# Patient Record
Sex: MALE | Race: WHITE
[De-identification: names, ages, dates, MRNs, and addresses within clinical notes are randomized per-mention and may not be internally consistent; named-entity substitution may affect disease eponyms.]

---

## 2018-03-21 ENCOUNTER — HOSPITAL ENCOUNTER (EMERGENCY)
Dept: HOSPITAL 93 - ER | Age: 78
Discharge: HOME | End: 2018-03-21
Payer: COMMERCIAL

## 2018-03-21 VITALS — BODY MASS INDEX: 25.9 KG/M2 | WEIGHT: 165 LBS | HEIGHT: 67 IN
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
## 2018-03-21 DIAGNOSIS — S01.121A: Primary | ICD-10-CM
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
## 2018-06-01 ENCOUNTER — HOSPITAL ENCOUNTER (EMERGENCY)
Dept: HOSPITAL 93 - ER | Age: 78
Discharge: HOME | End: 2018-06-01
Payer: COMMERCIAL

## 2018-06-01 VITALS — WEIGHT: 163 LBS | HEIGHT: 67 IN | BODY MASS INDEX: 25.58 KG/M2

## 2018-06-01 DIAGNOSIS — R42: ICD-10-CM

## 2018-06-01 DIAGNOSIS — G25.2: ICD-10-CM

## 2018-06-01 DIAGNOSIS — D32.0: Primary | ICD-10-CM

## 2018-06-01 DIAGNOSIS — F41.8: ICD-10-CM

## 2018-06-01 PROCEDURE — 70552 MRI BRAIN STEM W/DYE: CPT

## 2019-04-19 ENCOUNTER — APPOINTMENT (OUTPATIENT)
Dept: MRI IMAGING | Age: 79
DRG: 101 | End: 2019-04-19
Attending: HOSPITALIST
Payer: COMMERCIAL

## 2019-04-19 ENCOUNTER — APPOINTMENT (OUTPATIENT)
Dept: CT IMAGING | Age: 79
DRG: 101 | End: 2019-04-19
Attending: HOSPITALIST
Payer: COMMERCIAL

## 2019-04-19 ENCOUNTER — HOSPITAL ENCOUNTER (EMERGENCY)
Age: 79
Discharge: ANOTHER HOSPITAL | End: 2019-04-19
Attending: EMERGENCY MEDICINE
Payer: MEDICARE

## 2019-04-19 ENCOUNTER — APPOINTMENT (OUTPATIENT)
Dept: CT IMAGING | Age: 79
End: 2019-04-19
Attending: EMERGENCY MEDICINE
Payer: MEDICARE

## 2019-04-19 ENCOUNTER — APPOINTMENT (OUTPATIENT)
Dept: GENERAL RADIOLOGY | Age: 79
End: 2019-04-19
Attending: EMERGENCY MEDICINE
Payer: MEDICARE

## 2019-04-19 ENCOUNTER — HOSPITAL ENCOUNTER (INPATIENT)
Age: 79
LOS: 1 days | Discharge: HOME | DRG: 101 | End: 2019-04-20
Attending: EMERGENCY MEDICINE | Admitting: HOSPITALIST
Payer: COMMERCIAL

## 2019-04-19 VITALS
DIASTOLIC BLOOD PRESSURE: 56 MMHG | SYSTOLIC BLOOD PRESSURE: 126 MMHG | OXYGEN SATURATION: 91 % | BODY MASS INDEX: 24.33 KG/M2 | TEMPERATURE: 97.9 F | RESPIRATION RATE: 24 BRPM | HEIGHT: 67 IN | HEART RATE: 74 BPM | WEIGHT: 155 LBS

## 2019-04-19 DIAGNOSIS — D32.0 MENINGIOMA, CEREBRAL (HCC): ICD-10-CM

## 2019-04-19 DIAGNOSIS — R06.2 BILATERAL WHEEZING: ICD-10-CM

## 2019-04-19 DIAGNOSIS — R56.9 SEIZURE (HCC): Primary | ICD-10-CM

## 2019-04-19 DIAGNOSIS — R56.9 NEW ONSET SEIZURE (HCC): Primary | ICD-10-CM

## 2019-04-19 DIAGNOSIS — G93.89 FRONTAL MASS OF BRAIN: ICD-10-CM

## 2019-04-19 DIAGNOSIS — Z87.820 HISTORY OF TRAUMATIC BRAIN INJURY: ICD-10-CM

## 2019-04-19 DIAGNOSIS — G40.409 EPILEPSY SYMPTOMATIC, GENERALIZED (HCC): ICD-10-CM

## 2019-04-19 LAB
ALBUMIN SERPL-MCNC: 3.7 G/DL (ref 3.5–5)
ALBUMIN/GLOB SERPL: 1.1 {RATIO} (ref 1.1–2.2)
ALP SERPL-CCNC: 68 U/L (ref 45–117)
ALT SERPL-CCNC: 29 U/L (ref 12–78)
ANION GAP SERPL CALC-SCNC: 10 MMOL/L (ref 5–15)
APTT PPP: 32 SEC (ref 22.1–32)
AST SERPL-CCNC: 36 U/L (ref 15–37)
ATRIAL RATE: 72 BPM
BASOPHILS # BLD: 0.1 K/UL (ref 0–0.1)
BASOPHILS NFR BLD: 1 % (ref 0–1)
BILIRUB SERPL-MCNC: 0.6 MG/DL (ref 0.2–1)
BNP SERPL-MCNC: 47 PG/ML
BUN SERPL-MCNC: 17 MG/DL (ref 6–20)
BUN/CREAT SERPL: 13 (ref 12–20)
CALCIUM SERPL-MCNC: 9.1 MG/DL (ref 8.5–10.1)
CALCULATED P AXIS, ECG09: 59 DEGREES
CALCULATED R AXIS, ECG10: 45 DEGREES
CALCULATED T AXIS, ECG11: 49 DEGREES
CHLORIDE SERPL-SCNC: 107 MMOL/L (ref 97–108)
CO2 SERPL-SCNC: 23 MMOL/L (ref 21–32)
COMMENT, HOLDF: NORMAL
COMMENT, HOLDF: NORMAL
CREAT SERPL-MCNC: 1.32 MG/DL (ref 0.7–1.3)
DIAGNOSIS, 93000: NORMAL
DIFFERENTIAL METHOD BLD: ABNORMAL
EOSINOPHIL # BLD: 0.3 K/UL (ref 0–0.4)
EOSINOPHIL NFR BLD: 4 % (ref 0–7)
ERYTHROCYTE [DISTWIDTH] IN BLOOD BY AUTOMATED COUNT: 12.9 % (ref 11.5–14.5)
FLUAV AG NPH QL IA: NEGATIVE
FLUBV AG NOSE QL IA: NEGATIVE
GLOBULIN SER CALC-MCNC: 3.5 G/DL (ref 2–4)
GLUCOSE SERPL-MCNC: 157 MG/DL (ref 65–100)
HCT VFR BLD AUTO: 49.8 % (ref 36.6–50.3)
HGB BLD-MCNC: 16.5 G/DL (ref 12.1–17)
IMM GRANULOCYTES # BLD AUTO: 0 K/UL (ref 0–0.04)
IMM GRANULOCYTES NFR BLD AUTO: 1 % (ref 0–0.5)
INR PPP: 1 (ref 0.9–1.1)
LYMPHOCYTES # BLD: 1.6 K/UL (ref 0.8–3.5)
LYMPHOCYTES NFR BLD: 21 % (ref 12–49)
MCH RBC QN AUTO: 30.8 PG (ref 26–34)
MCHC RBC AUTO-ENTMCNC: 33.1 G/DL (ref 30–36.5)
MCV RBC AUTO: 92.9 FL (ref 80–99)
MONOCYTES # BLD: 0.4 K/UL (ref 0–1)
MONOCYTES NFR BLD: 5 % (ref 5–13)
NEUTS SEG # BLD: 5.3 K/UL (ref 1.8–8)
NEUTS SEG NFR BLD: 68 % (ref 32–75)
NRBC # BLD: 0 K/UL (ref 0–0.01)
NRBC BLD-RTO: 0 PER 100 WBC
P-R INTERVAL, ECG05: 176 MS
PLATELET # BLD AUTO: 160 K/UL (ref 150–400)
PMV BLD AUTO: 10 FL (ref 8.9–12.9)
POTASSIUM SERPL-SCNC: 4.3 MMOL/L (ref 3.5–5.1)
PROT SERPL-MCNC: 7.2 G/DL (ref 6.4–8.2)
PROTHROMBIN TIME: 10.1 SEC (ref 9–11.1)
Q-T INTERVAL, ECG07: 390 MS
QRS DURATION, ECG06: 86 MS
QTC CALCULATION (BEZET), ECG08: 427 MS
RBC # BLD AUTO: 5.36 M/UL (ref 4.1–5.7)
SAMPLES BEING HELD,HOLD: NORMAL
SAMPLES BEING HELD,HOLD: NORMAL
SODIUM SERPL-SCNC: 140 MMOL/L (ref 136–145)
THERAPEUTIC RANGE,PTTT: NORMAL SECS (ref 58–77)
TROPONIN I SERPL-MCNC: <0.05 NG/ML
VENTRICULAR RATE, ECG03: 72 BPM
WBC # BLD AUTO: 7.7 K/UL (ref 4.1–11.1)

## 2019-04-19 PROCEDURE — 99285 EMERGENCY DEPT VISIT HI MDM: CPT

## 2019-04-19 PROCEDURE — 36415 COLL VENOUS BLD VENIPUNCTURE: CPT

## 2019-04-19 PROCEDURE — 74011250636 HC RX REV CODE- 250/636: Performed by: HOSPITALIST

## 2019-04-19 PROCEDURE — 85610 PROTHROMBIN TIME: CPT

## 2019-04-19 PROCEDURE — 74011250636 HC RX REV CODE- 250/636: Performed by: EMERGENCY MEDICINE

## 2019-04-19 PROCEDURE — 93005 ELECTROCARDIOGRAM TRACING: CPT

## 2019-04-19 PROCEDURE — 80053 COMPREHEN METABOLIC PANEL: CPT

## 2019-04-19 PROCEDURE — 71045 X-RAY EXAM CHEST 1 VIEW: CPT

## 2019-04-19 PROCEDURE — 74011000258 HC RX REV CODE- 258: Performed by: EMERGENCY MEDICINE

## 2019-04-19 PROCEDURE — 84484 ASSAY OF TROPONIN QUANT: CPT

## 2019-04-19 PROCEDURE — 74011636637 HC RX REV CODE- 636/637: Performed by: EMERGENCY MEDICINE

## 2019-04-19 PROCEDURE — 74011000258 HC RX REV CODE- 258: Performed by: HOSPITALIST

## 2019-04-19 PROCEDURE — A9575 INJ GADOTERATE MEGLUMI 0.1ML: HCPCS | Performed by: HOSPITALIST

## 2019-04-19 PROCEDURE — 74011000250 HC RX REV CODE- 250: Performed by: EMERGENCY MEDICINE

## 2019-04-19 PROCEDURE — 71250 CT THORAX DX C-: CPT

## 2019-04-19 PROCEDURE — 65660000000 HC RM CCU STEPDOWN

## 2019-04-19 PROCEDURE — 74011250637 HC RX REV CODE- 250/637: Performed by: FAMILY MEDICINE

## 2019-04-19 PROCEDURE — 96374 THER/PROPH/DIAG INJ IV PUSH: CPT

## 2019-04-19 PROCEDURE — 83880 ASSAY OF NATRIURETIC PEPTIDE: CPT

## 2019-04-19 PROCEDURE — 74011000250 HC RX REV CODE- 250: Performed by: HOSPITALIST

## 2019-04-19 PROCEDURE — 70450 CT HEAD/BRAIN W/O DYE: CPT

## 2019-04-19 PROCEDURE — C9113 INJ PANTOPRAZOLE SODIUM, VIA: HCPCS | Performed by: HOSPITALIST

## 2019-04-19 PROCEDURE — 85025 COMPLETE CBC W/AUTO DIFF WBC: CPT

## 2019-04-19 PROCEDURE — 70553 MRI BRAIN STEM W/O & W/DYE: CPT

## 2019-04-19 PROCEDURE — 94640 AIRWAY INHALATION TREATMENT: CPT

## 2019-04-19 PROCEDURE — 87804 INFLUENZA ASSAY W/OPTIC: CPT

## 2019-04-19 PROCEDURE — 85730 THROMBOPLASTIN TIME PARTIAL: CPT

## 2019-04-19 RX ORDER — LORAZEPAM 2 MG/ML
1 INJECTION INTRAMUSCULAR
Status: DISCONTINUED | OUTPATIENT
Start: 2019-04-19 | End: 2019-04-20 | Stop reason: HOSPADM

## 2019-04-19 RX ORDER — THERA TABS 400 MCG
1 TAB ORAL DAILY
COMMUNITY

## 2019-04-19 RX ORDER — PREDNISONE 20 MG/1
60 TABLET ORAL
Status: COMPLETED | OUTPATIENT
Start: 2019-04-19 | End: 2019-04-19

## 2019-04-19 RX ORDER — GADOTERATE MEGLUMINE 376.9 MG/ML
15 INJECTION INTRAVENOUS
Status: COMPLETED | OUTPATIENT
Start: 2019-04-19 | End: 2019-04-19

## 2019-04-19 RX ORDER — ACETAMINOPHEN 325 MG/1
650 TABLET ORAL
Status: DISCONTINUED | OUTPATIENT
Start: 2019-04-19 | End: 2019-04-20 | Stop reason: HOSPADM

## 2019-04-19 RX ORDER — ONDANSETRON 2 MG/ML
4 INJECTION INTRAMUSCULAR; INTRAVENOUS
Status: DISCONTINUED | OUTPATIENT
Start: 2019-04-19 | End: 2019-04-20 | Stop reason: HOSPADM

## 2019-04-19 RX ORDER — IPRATROPIUM BROMIDE AND ALBUTEROL SULFATE 2.5; .5 MG/3ML; MG/3ML
3 SOLUTION RESPIRATORY (INHALATION)
Status: COMPLETED | OUTPATIENT
Start: 2019-04-19 | End: 2019-04-19

## 2019-04-19 RX ORDER — SODIUM CHLORIDE 0.9 % (FLUSH) 0.9 %
5-40 SYRINGE (ML) INJECTION EVERY 8 HOURS
Status: DISCONTINUED | OUTPATIENT
Start: 2019-04-19 | End: 2019-04-20 | Stop reason: HOSPADM

## 2019-04-19 RX ORDER — SODIUM CHLORIDE 0.9 % (FLUSH) 0.9 %
5-40 SYRINGE (ML) INJECTION AS NEEDED
Status: DISCONTINUED | OUTPATIENT
Start: 2019-04-19 | End: 2019-04-20 | Stop reason: HOSPADM

## 2019-04-19 RX ORDER — SODIUM CHLORIDE 9 MG/ML
100 INJECTION, SOLUTION INTRAVENOUS CONTINUOUS
Status: DISPENSED | OUTPATIENT
Start: 2019-04-19 | End: 2019-04-19

## 2019-04-19 RX ADMIN — IPRATROPIUM BROMIDE AND ALBUTEROL SULFATE 3 ML: .5; 3 SOLUTION RESPIRATORY (INHALATION) at 05:00

## 2019-04-19 RX ADMIN — SODIUM CHLORIDE 500 MG: 900 INJECTION, SOLUTION INTRAVENOUS at 18:34

## 2019-04-19 RX ADMIN — LEVETIRACETAM 500 MG: 500 INJECTION, SOLUTION INTRAVENOUS at 06:22

## 2019-04-19 RX ADMIN — GADOTERATE MEGLUMINE 15 ML: 376.9 INJECTION INTRAVENOUS at 21:12

## 2019-04-19 RX ADMIN — ACETAMINOPHEN 650 MG: 325 TABLET ORAL at 22:48

## 2019-04-19 RX ADMIN — SODIUM CHLORIDE 100 ML/HR: 900 INJECTION, SOLUTION INTRAVENOUS at 09:55

## 2019-04-19 RX ADMIN — PANTOPRAZOLE SODIUM 40 MG: 40 INJECTION, POWDER, FOR SOLUTION INTRAVENOUS at 09:55

## 2019-04-19 RX ADMIN — PREDNISONE 60 MG: 20 TABLET ORAL at 05:00

## 2019-04-19 RX ADMIN — Medication 10 ML: at 18:34

## 2019-04-19 RX ADMIN — Medication 10 ML: at 22:17

## 2019-04-19 NOTE — ROUTINE PROCESS
TRANSFER - OUT REPORT: 
 
Verbal report given to Sanjana Willard RN(name) on Meenakshi Johnson  being transferred to [facility](unit) for routine progression of care Report consisted of patients Situation, Background, Assessment and  
Recommendations(SBAR). Information from the following report(s) SBAR, ED Summary, [facility] and Recent Results was reviewed with the receiving nurse. Lines:  
Peripheral IV 04/19/19 Left Antecubital (Active) Site Assessment Clean, dry, & intact 4/19/2019  4:45 AM  
Phlebitis Assessment 0 4/19/2019  4:45 AM  
Infiltration Assessment 0 4/19/2019  4:45 AM  
Dressing Status Clean, dry, & intact 4/19/2019  4:45 AM  
Dressing Type Transparent;Tape 4/19/2019  4:45 AM  
Hub Color/Line Status Green;Patent; Flushed 4/19/2019  4:45 AM  
  
 
Opportunity for questions and clarification was provided. Patient transported with: 
 Monitor O2 @ 2 liters

## 2019-04-19 NOTE — ED NOTES
Patient arrives from [facility] for admission, had new onset seizure, found to have a brain mass. Neurosurgery aware of the patient. CONSULT NOTE: 
8:39 AM Karina Middleton MD communicated with Ayaan Faith NP, Consult for Hospitalist via [facility] Text. Discussed available diagnostic tests and clinical findings. Dr. Jovan Diaz will see and admit the patient.

## 2019-04-19 NOTE — ROUTINE PROCESS
Bedside shift change report given to Jalen (oncoming nurse) by Zachariah Campbell (offgoing nurse). Report included the following information SBAR, Kardex, Med Rec Status and Cardiac Rhythm NSR c 1 AVB. Uzbek speaking. Family in room. Patient awaiting MRI.

## 2019-04-19 NOTE — PROGRESS NOTES
Admission Medication Reconciliation: 
Information obtained from: patient's family members Significant PMH/Disease States: No past medical history on file. Chief Complaint for this Admission:  seizure Allergies: Avelox [moxifloxacin] Prior to Admission Medications:  
Prior to Admission Medications Prescriptions Last Dose Informant Patient Reported? Taking? therapeutic multivitamin [facility]) tablet   Yes Yes Sig: Take 1 Tab by mouth daily. Facility-Administered Medications: None Comments/Recommendations: Added MVI. Patient's family noted that he does not take any other medications.

## 2019-04-19 NOTE — ED PROVIDER NOTES
The history is provided by the patient, a relative and the spouse. The history is limited by a language barrier. A  was used (relatives). Respiratory Distress This is a new problem. The current episode started yesterday. The problem has been rapidly worsening. Associated symptoms include cough and wheezing. Pertinent negatives include no fever and no headaches. Precipitated by: possible pollens and recent travel. Treatments tried: duo-neb by EMS. The treatment provided moderate relief. No past medical history on file. Patient with \"hernia pushing on his lungs\" scheduled for surgery back in [facility] 
No Hx of asthma or COPD No past surgical history on file. No family history on file. Social History Socioeconomic History  Marital status: Not on file Spouse name: Not on file  Number of children: Not on file  Years of education: Not on file  Highest education level: Not on file Occupational History  Not on file Social Needs  Financial resource strain: Not on file  Food insecurity:  
  Worry: Not on file Inability: Not on file  Transportation needs:  
  Medical: Not on file Non-medical: Not on file Tobacco Use  Smoking status: Not on file Substance and Sexual Activity  Alcohol use: Not on file  Drug use: Not on file  Sexual activity: Not on file Lifestyle  Physical activity:  
  Days per week: Not on file Minutes per session: Not on file  Stress: Not on file Relationships  Social connections:  
  Talks on phone: Not on file Gets together: Not on file Attends Christianity service: Not on file Active member of club or organization: Not on file Attends meetings of clubs or organizations: Not on file Relationship status: Not on file  Intimate partner violence:  
  Fear of current or ex partner: Not on file Emotionally abused: Not on file Physically abused: Not on file Forced sexual activity: Not on file Other Topics Concern  Not on file Social History Narrative  Not on file ALLERGIES: Avelox [moxifloxacin] Review of Systems Constitutional: Negative for chills and fever. HENT: Positive for congestion and drooling. Respiratory: Positive for cough, chest tightness, shortness of breath and wheezing. Cardiovascular: Negative. Gastrointestinal: Negative. Genitourinary: Negative. Musculoskeletal: Negative. Skin: Negative. Neurological: Positive for seizures. Negative for headaches. Per wife jaw clenched, drooling and arms/legs shaking and stiff Psychiatric/Behavioral: Negative. Vitals:  
 04/19/19 8486 BP: 124/58 Pulse: 68 Resp: 11 Temp: 97.9 °F (36.6 °C) SpO2: 95% Weight: 70.3 kg (155 lb) Height: 5' 7\" (1.702 m) Physical Exam  
Constitutional: He is oriented to person, place, and time. He appears well-developed and well-nourished. He appears distressed. HENT:  
Head: Normocephalic and atraumatic. Eyes: Pupils are equal, round, and reactive to light. Conjunctivae and EOM are normal.  
Neck: Normal range of motion. Cardiovascular: Normal rate, regular rhythm, normal heart sounds and intact distal pulses. No murmur heard. Pulmonary/Chest: No stridor. He is in respiratory distress. He has wheezes. Abdominal: Soft. Bowel sounds are normal. There is no tenderness. There is no guarding. Musculoskeletal: Normal range of motion. He exhibits no edema, tenderness or deformity. Neurological: He is alert and oriented to person, place, and time. He exhibits normal muscle tone. Coordination normal.  
Skin: Skin is warm and dry. He is not diaphoretic. Psychiatric: He has a normal mood and affect. Nursing note and vitals reviewed. MDM Number of Diagnoses or Management Options Bilateral wheezing:  
Frontal mass of brain:  
New onset seizure [facility]):  
 Diagnosis management comments: 1) respiratory distress - possible reactive airway disease due to pollens vs PNA vs other illness (ie. 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 
Plan - check labs, CXR, EKG, give neb and prednisone and re-eval 
 
  
Amount and/or Complexity of Data Reviewed Clinical lab tests: ordered and reviewed Tests in the radiology section of CPT®: ordered and reviewed Discuss the patient with other providers: yes (Dr. Natasha Healy - neurosurgery, Dr. Shane Boogie - hospitalist and Dr. Jodi Mejia - Emergency medicine at [facility] to arrange transfer to that facility ) Independent visualization of images, tracings, or specimens: yes (Head CT - right frontal lobe mass, ?surrounding edema) Risk of Complications, Morbidity, and/or Mortality Presenting problems: high Diagnostic procedures: high Management options: high Critical Care Total time providing critical care: 30-74 minutes (Total critical care time spent exclusive of procedures:  35 minutes, new onset seizure, wheezing with respiratory distress. Discussions with patient and family and other providers to arrange transfer to higher level of care.) Procedures EK19 @ 04:47  normal EKG, normal sinus rhythm, rate 72, normal intervals, no ectopy, no ischemia noted. VITALS:  
Patient Vitals for the past 8 hrs: 
 Temp Pulse Resp BP SpO2  
19 0445  74 30 137/53 99 % 19 0438 97.9 °F (36.6 °C) 68 11 124/58 95 % Recent Results (from the past 24 hour(s)) CBC WITH AUTOMATED DIFF Collection Time: 19  4:45 AM  
Result Value Ref Range WBC 7.7 4.1 - 11.1 K/uL  
 RBC 5.36 4.10 - 5.70 M/uL  
 HGB 16.5 12.1 - 17.0 g/dL HCT 49.8 36.6 - 50.3 % MCV 92.9 80.0 - 99.0 FL  
 MCH 30.8 26.0 - 34.0 PG  
 MCHC 33.1 30.0 - 36.5 g/dL  
 RDW 12.9 11.5 - 14.5 % PLATELET 797 710 - 162 K/uL MPV 10.0 8.9 - 12.9 FL  
 NRBC 0.0 0  WBC ABSOLUTE NRBC 0.00 0.00 - 0.01 K/uL NEUTROPHILS 68 32 - 75 % LYMPHOCYTES 21 12 - 49 % MONOCYTES 5 5 - 13 % EOSINOPHILS 4 0 - 7 % BASOPHILS 1 0 - 1 % IMMATURE GRANULOCYTES 1 (H) 0.0 - 0.5 % ABS. NEUTROPHILS 5.3 1.8 - 8.0 K/UL  
 ABS. LYMPHOCYTES 1.6 0.8 - 3.5 K/UL  
 ABS. MONOCYTES 0.4 0.0 - 1.0 K/UL  
 ABS. EOSINOPHILS 0.3 0.0 - 0.4 K/UL  
 ABS. BASOPHILS 0.1 0.0 - 0.1 K/UL  
 ABS. IMM. GRANS. 0.0 0.00 - 0.04 K/UL  
 DF AUTOMATED METABOLIC PANEL, COMPREHENSIVE Collection Time: 04/19/19  4:45 AM  
Result Value Ref Range Sodium 140 136 - 145 mmol/L Potassium 4.3 3.5 - 5.1 mmol/L Chloride 107 97 - 108 mmol/L  
 CO2 23 21 - 32 mmol/L Anion gap 10 5 - 15 mmol/L Glucose 157 (H) 65 - 100 mg/dL BUN 17 6 - 20 MG/DL Creatinine 1.32 (H) 0.70 - 1.30 MG/DL  
 BUN/Creatinine ratio 13 12 - 20 GFR est AA >60 >60 ml/min/1.73m2 GFR est non-AA 53 (L) >60 ml/min/1.73m2 Calcium 9.1 8.5 - 10.1 MG/DL Bilirubin, total 0.6 0.2 - 1.0 MG/DL  
 ALT (SGPT) 29 12 - 78 U/L  
 AST (SGOT) 36 15 - 37 U/L Alk. phosphatase 68 45 - 117 U/L Protein, total 7.2 6.4 - 8.2 g/dL Albumin 3.7 3.5 - 5.0 g/dL Globulin 3.5 2.0 - 4.0 g/dL A-G Ratio 1.1 1.1 - 2.2 INFLUENZA A & B AG (RAPID TEST) Collection Time: 04/19/19  4:45 AM  
Result Value Ref Range Influenza A Antigen NEGATIVE  NEG Influenza B Antigen NEGATIVE  NEG    
NT-PRO BNP Collection Time: 04/19/19  4:45 AM  
Result Value Ref Range NT pro-BNP 47 <450 PG/ML  
TROPONIN I Collection Time: 04/19/19  4:45 AM  
Result Value Ref Range Troponin-I, Qt. <0.05 <0.05 ng/mL EKG, 12 LEAD, INITIAL Collection Time: 04/19/19  4:47 AM  
Result Value Ref Range  Ventricular Rate 72 BPM  
 Atrial Rate 72 BPM  
 P-R Interval 176 ms QRS Duration 86 ms  
 Q-T Interval 390 ms QTC Calculation (Bezet) 427 ms Calculated P Axis 59 degrees Calculated R Axis 45 degrees Calculated T Axis 49 degrees Diagnosis Normal sinus rhythm Normal ECG No previous ECGs available SAMPLES BEING HELD Collection Time: 04/19/19  4:48 AM  
Result Value Ref Range SAMPLES BEING HELD SST,RD,ANGELO,DRK GRN   
 COMMENT Add-on orders for these samples will be processed based on acceptable specimen integrity and analyte stability, which may vary by analyte. Ct Head Wo Cont Result Date: 4/19/2019 EXAM:  CT head without contrast INDICATION: Seizure. COMPARISON: None. TECHNIQUE: Axial noncontrast head CT from foramen magnum to vertex. Coronal and sagittal reformatted images were obtained. CT dose reduction was achieved through use of a standardized protocol tailored for this examination and automatic exposure control for dose modulation. FINDINGS:  There is diffuse age-related parenchymal volume loss. The ventricles and sulci are age-appropriate without hydrocephalus. There is no mass effect or midline shift. There is no intracranial hemorrhage or extra-axial fluid collection. A right frontal lobe mass measures 1.7 x 1.9 cm (series 2, image 15). This may be parenchymal or dural-based. The basal cisterns are patent. A prior left frontoparietal craniotomy is noted. There is diffuse paranasal sinus mucosal thickening. The mastoid air cells are clear. IMPRESSION: 1. A right frontal lobe mass measures 1.7 x 1.9 cm. This may be parenchymal or dural based. Comparison with prior imaging is suggested. No acute intracranial hemorrhage. 2. Diffuse paranasal sinus inflammation. Xr Chest [facility] Result Date: 4/19/2019 EXAM:  CR chest portable INDICATION: Shortness of breath and wheezing COMPARISON: None. TECHNIQUE: Portable AP upright chest view at 0445 hours FINDINGS: Cardiac monitoring wires overlie the thorax.  The cardiomediastinal contours are within normal limits. The lungs and pleural spaces are clear. There is no pneumothorax. The bones and upper abdomen are normal for age. IMPRESSION: No acute process.

## 2019-04-19 NOTE — ED TRIAGE NOTES
Pt arrives via EMS from home c/o difficulty breathing. HX hernia \"pushing into lungs,\"  Pt it is from [facility] and has been here for 2 days. Pt had duo neb in route. EMS states audible wheezing on scene, has since improved since breathing treatment.

## 2019-04-19 NOTE — ROUTINE PROCESS
TRANSFER - OUT REPORT: 
 
Verbal report given to [facility] RN(name) on [employer]  being transferred to [facility](unit) for routine progression of care Report consisted of patients Situation, Background, Assessment and  
Recommendations(SBAR). Information from the following report(s) SBAR, Kardex, ED Summary, [facility] and Recent Results was reviewed with the receiving nurse. Lines:  
Peripheral IV 04/19/19 Right Antecubital (Active) Site Assessment Clean, dry, & intact 4/19/2019 10:06 AM  
Phlebitis Assessment 0 4/19/2019 10:06 AM  
Infiltration Assessment 0 4/19/2019 10:06 AM  
Dressing Status Clean, dry, & intact 4/19/2019 10:06 AM  
Hub Color/Line Status Pink 4/19/2019 10:06 AM  
  
 
Opportunity for questions and clarification was provided. Pt in no acute distress at time of transfer. Patient transported with: 
 [facility]
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
## 2019-04-19 NOTE — ED TRIAGE NOTES
Pt comes in via [facility] as a transfer from [facility] for neuro consult.  Pt had unwitnessed seizure this am around 3am. Pt has hx of TBI ~1 year ago in [facility].
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
## 2019-04-20 VITALS
DIASTOLIC BLOOD PRESSURE: 57 MMHG | OXYGEN SATURATION: 94 % | BODY MASS INDEX: 25.8 KG/M2 | HEIGHT: 67 IN | WEIGHT: 164.4 LBS | SYSTOLIC BLOOD PRESSURE: 112 MMHG | TEMPERATURE: 97.9 F | RESPIRATION RATE: 18 BRPM | HEART RATE: 59 BPM

## 2019-04-20 PROCEDURE — 74011250636 HC RX REV CODE- 250/636: Performed by: HOSPITALIST

## 2019-04-20 PROCEDURE — 74011000250 HC RX REV CODE- 250: Performed by: HOSPITALIST

## 2019-04-20 PROCEDURE — C9113 INJ PANTOPRAZOLE SODIUM, VIA: HCPCS | Performed by: HOSPITALIST

## 2019-04-20 PROCEDURE — 74011000258 HC RX REV CODE- 258: Performed by: HOSPITALIST

## 2019-04-20 PROCEDURE — 74011250637 HC RX REV CODE- 250/637: Performed by: PSYCHIATRY & NEUROLOGY

## 2019-04-20 RX ORDER — ACETAMINOPHEN 325 MG/1
650 TABLET ORAL
Qty: 20 TAB | Refills: 0 | Status: SHIPPED | OUTPATIENT
Start: 2019-04-20

## 2019-04-20 RX ORDER — DEXTROSE MONOHYDRATE AND SODIUM CHLORIDE 5; .45 G/100ML; G/100ML
75 INJECTION, SOLUTION INTRAVENOUS CONTINUOUS
Status: DISCONTINUED | OUTPATIENT
Start: 2019-04-20 | End: 2019-04-20 | Stop reason: HOSPADM

## 2019-04-20 RX ORDER — LEVETIRACETAM 500 MG/1
500 TABLET ORAL 2 TIMES DAILY
Qty: 60 TAB | Refills: 2 | Status: SHIPPED | OUTPATIENT
Start: 2019-04-20

## 2019-04-20 RX ORDER — LEVETIRACETAM 500 MG/1
500 TABLET ORAL 2 TIMES DAILY
Status: DISCONTINUED | OUTPATIENT
Start: 2019-04-20 | End: 2019-04-20 | Stop reason: HOSPADM

## 2019-04-20 RX ORDER — ONDANSETRON 4 MG/1
4 TABLET, FILM COATED ORAL
Qty: 20 TAB | Refills: 0 | Status: SHIPPED | OUTPATIENT
Start: 2019-04-20

## 2019-04-20 RX ADMIN — LEVETIRACETAM 500 MG: 500 TABLET ORAL at 13:06

## 2019-04-20 RX ADMIN — PANTOPRAZOLE SODIUM 40 MG: 40 INJECTION, POWDER, FOR SOLUTION INTRAVENOUS at 10:37

## 2019-04-20 RX ADMIN — Medication 10 ML: at 13:07

## 2019-04-20 RX ADMIN — Medication 10 ML: at 06:34

## 2019-04-20 RX ADMIN — SODIUM CHLORIDE 500 MG: 900 INJECTION, SOLUTION INTRAVENOUS at 06:34
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
## 2019-04-20 NOTE — DISCHARGE INSTRUCTIONS
Patient Education     Patient to have a regular diet as tolerated. Patient to do regular daily activities as tolerated. Patient to start taking Keppra 500 mg orally twice a day to prevent recurrent seizures. Patient clearly instructed to not drive neither operate any heavy equipment or machinery until cleared to do so by a Neurologist in the future. This information is reportable to [facility] by law. Patient to follow up with PCP in 1 week. Patient to follow up with Neurosurgery in 2 weeks. Patient to follow up with Neurology in 2 weeks. Convulsión: Instrucciones de cuidado - [ Seizure: Care Instructions ]  Instrucciones de [address] convulsiones son causadas por patrones anormales de señales eléctricas en el cerebro. Son diferentes para cada persona. Las convulsiones pueden afectar el movimiento, el habla, la visión o la conciencia. Algunas personas solo tienen temblores leves en [place] mano y no pierden el conocimiento. Otras personas pueden desmayarse y tener temblores violentos en todo el cuerpo. Algunas personas parecen tener la mirada fija en el espacio. Están despiertas, margie no pueden responder normalmente. Más tarde, es posible que no recuerden lo que sucedió. Amy Gala necesarias pruebas para identificar el tipo y [address] convulsiones. Un episodio de convulsiones puede ocurrir solo adiel vez, o quizás las tenga más de Alfonso. Min los [facility] fueron indicados y hacer un seguimiento con conn médico puede ayudar a que no tenga más convulsiones. El médico lo avery examinado minuciosamente, margie pueden desarrollarse problemas más tarde. Si nota algún problema o nuevos síntomas, busque tratamiento médico de inmediato. La atención de seguimiento es adiel parte clave de conn tratamiento y seguridad. Asegúrese de hacer y acudir a todas las citas, y llame a conn médico si está teniendo problemas.  También es adiel buena idea saber los resultados de gus exámenes y mantener adiel lista de [place] medicamentos que francisco. ¿Cómo puede cuidarse en el hogar? · Sea dm con los medicamentos. [place] gus medicamentos exactamente analisa le fueron recetados. Llame a conn médico si piensa que está teniendo un problema con conn medicamento. · No realice ninguna actividad que pueda ser peligrosa para usted o los demás [employer] conn médico diga que es seguro [place]. Por ejemplo, no conduzca un automóvil, opere maquinaria, nade, ni suba por escaleras de mano. · Asegúrese de que quien lo esté tratando por cualquier problema de tena sepa que usted ha tenido convulsiones y qué medicamentos está tomando para ello. · Identifique y evite las cosas que puedan aumentar gus probabilidades de tener convulsiones. Estas pueden incluir la falta de sueño, el consumo de drogas o alcohol, el estrés y la falta de alimentación. · Asegúrese de asistir a la jose antonio de seguimiento. ¿Cuándo debe pedir ayuda? Llame al 911 en cualquier momento que crea que puede necesitar atención de [place]. Por ejemplo, llame si:    · Tiene otro episodio de convulsiones.     · Tiene más de un episodio de convulsiones en 24 horas.     · Tiene nuevos síntomas, tales analisa dificultades para caminar, hablar o pensar con claridad.    Llame a conn médico ahora mismo o busque atención médica inmediata si:    · Usted no está actuando de [place] normal.    Vigile muy de cerca los cambios en conn tena, y asegúrese de comunicarse con conn médico si tiene algún problema. ¿Dónde puede encontrar más información en inglés? Dada Schmitz a http://bob.info/. Gertrude  V175 en la búsqueda para aprender más acerca de \"Convulsión: Instrucciones de cuidado - [ Seizure: Care Instructions ]. \"  Revisado: 3 margarita, 2018  Versión del contenido: 11.9  © 6027-4396 [employer]. Las instrucciones de cuidado fueron adaptadas bajo licencia por [employer] (which disclaims liability or warranty for this information).  Si usted tiene [place] afección médica o sobre estas instrucciones, siempre pregunte a conn profesional de tena. [facility] niega toda garantía o responsabilidad por conn uso de esta información.

## 2019-04-20 NOTE — PROGRESS NOTES
Problem: Seizure Disorder (Adult) Goal: *STG: Remains free of seizure activity Outcome: Progressing Towards Goal 
  
Problem: Falls - Risk of 
Goal: *Absence of Falls Description Document [place] Fall Risk and appropriate interventions in the flowsheet.  
Outcome: Progressing Towards Goal

## 2019-04-20 NOTE — DISCHARGE SUMMARY
Kedar Watson 2906 SUMMARY    Name:  Markel Nielson  MR#:  [id number]  :  1940  ACCOUNT #:  [de-identified]  ADMIT DATE:  2019  DISCHARGE DATE:  2019      MAIN DIAGNOSIS ON ADMISSION:  Acute witnessed seizures. MAIN DIAGNOSIS ON DISCHARGE:  Acute onset seizures. CONSULTANTS:  1.  Neurology. 2.  Neurosurgery. HOSPITAL COURSE:  A 62-year-old primarily [place] Islander speaking gentleman with no significant past medical history except for a craniotomy in 2017 for a possible subdural hematoma was visiting family in the [address] [place], [place] [address] Floor from [facility] when the patient was noted to have some witnessed seizures. The patient was brought to the emergency room at which time an emergent CT scan of the head was positive for a frontal brain tumor. The patient was admitted to the neuro tele floor on further evaluation including with a metabolic panel that showed a sodium of 140, potassium 4.3, chloride 107, bicarb 23, a BUN of 17, creatinine 1.32, glucose 157, calcium 9.1. Normal liver function tests. First troponin less than 0.05, proBNP of 47. CBC with a WBC of 7.7, hemoglobin 16.5, hematocrit 49.8, platelet count of 490 and MCV of 92.9. A 12-lead EKG that showed normal sinus rhythm at 72 per minute. The patient underwent further evaluation with an MRI of the brain with and without contrast that showed a well-circumscribed 1.7 cm hypervascular extra-axial right subfrontal mass without edema most likely consistent with a meningioma. For this, the patient was seen and evaluated by the neurosurgical service who noted that no surgical indication was needed for the mass and recommended follow up with previous neurosurgeon as outpatient. The patient was seen by the neurology service who concurred with management of Keppra with transition to oral Keppra twice daily on discharge indefinitely.   The patient was clearly instructed throughout the hospital stay of the importance of avoiding any driving or operation of heavy equipment or machinery with a diagnosis of the acute seizures. The patient did not have any breakthrough seizures throughout the hospital course, was able to tolerate an advancing diet, able to ambulate unaided, had no other focal neurologic deficits and was deemed stable for discharge to home on twice daily Keppra after being cleared by neurology and neurosurgical services. EXAMINATION FINDINGS ON THIS DAY OF DISCHARGE:  GENERAL:  The patient is awake, alert, denying new complaints. VITAL SIGNS:  Blood pressure of 112/57, heart rate 59, respiratory rate 18, afebrile, saturating 94% on 2 liters nasal cannula. HEAD EXAM:  Normocephalic. Pupils equal and reactive to light. ENT EXAM:  Ears, nose, throat clear. NECK EXAM:  No jugular venous distention or carotid bruits. CARDIOVASCULAR EXAM:  S1, S2 present. RESPIRATORY EXAM:  Lungs with decreased air entry at both bases without any crepitations or rhonchi. GI EXAM:  Bowel sounds present. The abdomen is soft, nontender. No rebound, no guarding. GENITOURINARY EXAM:  Nil of note. MUSCULOSKELETAL EXAM:  No asymmetry of the extremities. CNS EXAM:  The patient is awake, alert; oriented to time, date, place, person. FINAL DIAGNOSES ON DISCHARGE ARE AS FOLLOWS:  1. Acute onset seizures without any status epilepticus. 2.  Frontal brain mass probably consistent with a meningioma. For outpatient neurosurgical followup. 3.  Probable dehydration. 4.  History of craniotomy for subdural hematoma in 2017. DISCHARGE MEDICATIONS ARE AS FOLLOWS:  1. Tylenol 650 mg p.o. every 6 hours as needed for headache or fever. 2.  Keppra 500 mg p.o. twice a day indefinitely. 3.  Zofran 4 mg 1 tablet p.o. every 8 hours as needed for nausea and vomiting. DISCHARGE INSTRUCTIONS:  The patient is to have a regular diet as tolerated, to do regular daily activities as tolerated.   The patient is to start taking Keppra 500 mg p.o. twice daily indefinitely to prevent recurrent seizures. The patient clearly instructed to not drive or operate any heavy equipment or machinery until cleared to do so in the future by neurologist.  Further, the patient also informed that this was a reportable measure to the Department of Motor Vehicle by law. The patient is to follow up with PCP in one week's time from discharge, neurologist in two weeks' time from discharge and neurosurgery in two weeks' time from discharge. The entire discharge plans including all medications, their side effects, the importance of adhering to all outpatient followup plans discussed in detail with the patient and family members at the bedside, primarily the patient's daughter, Flor Chou, who served as the . All questions and concerns were addressed. Total time for the discharge summary 50 minutes.         David Cox MD      [id number]  D:  04/20/2019 17:16  T:  04/20/2019 17:20  JOB #:  [id number]

## 2019-04-20 NOTE — PROGRESS NOTES
HD 2 
No further seizures 
afeb MRI consistent with small right sub-frontal meningioma without significant mass effect No surgery indicated at this time No evidence of SDH Will sign off

## 2019-04-20 NOTE — PROGRESS NOTES
Hospitalist Progress Note Alan Corona MD 
     
                             Answering service: [phone] OR [phone] from in house phone Date of Service:  2019 NAME:  Cornell Brito :  1940 MRN:  [id number] Admission Summary:  
 
68 Y/O primarily Kyrgyz speaking gentleman with a history significant for Craniotomy in 2017 for a probable Subdural hematoma and visiting from [facility] was brought to the ER for witnessed seizures. Reason for follow up:  
   
CC: witnessed seizures. Patient appeared comfortable on am rounds. Denied any headaches, dizziness, visual deficits. No documentation of further seizure activities. Assessment & Plan:  
 
1) CNS: Acute Seizures without any Status epilepticus. Probable Meningioma as noted on MRI brain. H/O Craniotomy () for a probable Subdural hematoma Continue Keppra, and close neurochecks. Neurosurgical eval noted and appreciated. Neurology consult. 2) Renal: Probable dehydration. IVFs and F/U BUN/Cr. 3) Prophylaxis: DVT. 4) Directives: Full Code with a guarded prognosis. D/W patient and patient's daughter. 5) Plan: Possible D/C to home on Anti-epileptic therapy in 1-2 days. Discussed in detail with patient and patient's daughter Marian Hernández at bedside serving as the . Hospital Problems  Never Reviewed Codes Class Noted POA Seizure ([facility] [address] ICD-10-CM: R56.9 ICD-9-CM: 780.39  2019 Unknown Right frontal lobe mass ICD-10-CM: G93.9 ICD-9-CM: 348.9  2019 Unknown Physical Examination:  
 
 Last 24hrs VS reviewed since prior progress note. Most recent are: 
Visit Vitals /40 (BP 1 Location: Left arm, BP Patient Position: At rest) Pulse (!) 56 Temp 97.5 °F (36.4 °C) Resp 21 Ht 5' 7\" (1.702 m) Wt 74.6 kg (164 lb 6.4 oz) SpO2 94% BMI 25.75 kg/m² Constitutional:  No acute distress, cooperative, pleasant   
HEENT: Head is a traumatic, PERRL, no nystagmus. Resp:  CTA bilaterally. No wheezing/rhonchi/rales. No accessory muscle use CV:  Regular rhythm, normal rate, no murmurs, gallops, rubs GI:  Soft, non distended, non tender. normoactive bowel sounds, no hepatosplenomegaly :  No CVA or suprapubic tenderness Skin  :  No erythema,rash,bullae,dipigmentation Musculoskeletal:  No edema, warm, 2+ pulses throughout Neuro: Awake and alert. Moves all extremities. No intake or output data in the 24 hours ending 04/20/19 1058 Labs:  
 
Recent Labs 04/19/19 
0445 WBC 7.7 HGB 16.5 HCT 49.8  Recent Labs 04/19/19 
0445   
K 4.3  CO2 23 BUN 17 CREA 1.32* * CA 9.1 Recent Labs 04/19/19 
0445 SGOT 36 ALT 29 AP 68 TBILI 0.6 TP 7.2 ALB 3.7 GLOB 3.5 Recent Labs 04/19/19 
0940 INR 1.0 PTP 10.1 APTT 32.0 No results for input(s): FE, TIBC, PSAT, FERR in the last 72 hours. No results found for: FOL, RBCF No results for input(s): PH, PCO2, PO2 in the last 72 hours. Recent Labs 04/19/19 
0445 TROIQ <0.05 No results found for: CHOL, CHOLX, CHLST, CHOLV, HDL, LDL, LDLC, DLDLP, TGLX, TRIGL, TRIGP, CHHD, CHHDX No results found for: John Pavan No results found for: COLOR, APPRN, SPGRU, REFSG, AKOSUA, PROTU, GLUCU, KETU, BILU, UROU, CRAIG, LEUKU, GLUKE, EPSU, BACTU, WBCU, RBCU, CASTS, UCRY Medications Reviewed:  
 
Current Facility-Administered Medications Medication Dose Route Frequency  sodium chloride (NS) flush 5-40 mL  5-40 mL IntraVENous Q8H  
 sodium chloride (NS) flush 5-40 mL  5-40 mL IntraVENous PRN  
 ondansetron (ZOFRAN) injection 4 mg  4 mg IntraVENous Q4H PRN  
 levETIRAcetam (KEPPRA) 500 mg in 0.9% sodium chloride 100 mL IVPB  500 mg IntraVENous Q12H  pantoprazole (PROTONIX) 40 mg in sodium chloride 0.9% 10 mL injection  40 mg IntraVENous DAILY  LORazepam (ATIVAN) injection 1 mg  1 mg IntraVENous Q2H PRN  
 acetaminophen (TYLENOL) tablet 650 mg  650 mg Oral Q6H PRN  
 
______________________________________________________________________ EXPECTED LENGTH OF STAY: - - - 
ACTUAL LENGTH OF STAY:          1 Mehreen Penny MD

## 2019-04-20 NOTE — PROGRESS NOTES
Bedside and Verbal shift change report given to Barry Bartholomew RN (oncoming nurse) by Jazmin Scott RN (offgoing nurse).  Report included the following information SBAR, Kardex, ED Summary, Intake/Output, MAR, Recent Results and Cardiac Rhythm SA.

## 2019-04-20 NOTE — PROGRESS NOTES
Problem: Seizure Disorder (Adult) Goal: *STG: Remains free of seizure activity Outcome: Progressing Towards Goal 
Goal: *STG/LTG: Complies with medication therapy Outcome: Progressing Towards Goal 
Goal: *STG: Remains safe in hospital 
Outcome: Progressing Towards Goal 
  
Problem: Pain Goal: *Control of Pain Outcome: Progressing Towards Goal 
  
Problem: Falls - Risk of 
Goal: *Absence of Falls Description Document [place] Fall Risk and appropriate interventions in the flowsheet.  
Outcome: Progressing Towards Goal

## 2019-04-24 NOTE — ADT AUTH CERT NOTES
Facility Name: [facility]   
         
[address] [facility] [address] NPI: [id number]  
  
  
  
  
   
Patient Demographics Patient Name Eduardo Evans Sex Male  
1940 Address [address] [place] [address] Rd[address] [place] [address] Phone [phone] (Home) [phone] (Mobile) Hospital Account Name Acct ID Class Status Primary Coverage Eduardo Evans [id number] INPATIENT Discharged/Not Muna Oconnor MEDICARE - [employer] MEDICARE CHOICE PPO/PFFS  
  
   
Guarantor Account (for Hospital Account [de-identified]) Name Relation to Pt Service Area Active? Acct Type Devonte Evans Self [address] Yes Personal/Family Address Phone [address] [place] [address] [phone](A)    
  
   
Coverage Information (for Hospital Account [de-identified]) F/O Payor/Plan Subscriber  Subscriber Sex Precert # [id number] [facility] PPO/PFFS [address] Subscriber Subscriber # Eduardo Evans [id number] Grp # Group Name [id number] Address Phone [address] [address] [address] Policy Number Status Effective Date Benefits Phone [id number] -  - Auth/Cert  
  
  
   
Diagnosis Codes Comments Seizure ([facility] [address]  ICD-10-CM: R56.9 ICD-9-CM: 780.39   
  
   
Admission Information Arrival Date/Time: 201914 Admit Date/Time: 201914 IP Adm. Date/Time: 2019 7727 Admission Type: Emergency Point of Origin: Non-health Care Facility/self Admit Category: Other Means of Arrival: Ambulance Primary Service: Medicine Secondary Service: N/A Transfer Source:  Service Area: [address] [place] [address] Unit: [facility] [place] 6S NEURO-SCI TELE Admit Provider: Queenie Mcintosh MD Attending Provider: Enoch Walker MD Referring Provider:   
Admission Information Attending Provider Admission Dx Admitted On Seizure [facility]), Right frontal lobe mass 19 Service Isolation Code Status MEDICINE  Prior Allergies Advance Care Planning Avelox [Moxifloxacin] Jump to the Activity    
Admission Information Unit/Bed: [facility] 6S NEURO-SCI TELE/01 Service: MEDICINE Admitting provider: Glynn Weathers MD Phone: [phone] Attending provider:  Phone: PCP: None Phone:   
Admission dx:  Patient class: I Admission type: ER

## 2023-07-05 NOTE — CONSULTS
Medicare Wellness Visit  Plan for Preventive Care    A good way for you to stay healthy is to use preventive care.  Medicare covers many services that can help you stay healthy.* The goal of these services is to find any health problems as quickly as possible. Finding problems early can help make them easier to treat.  Your personal plan below lists the services you may need and when they are due.      Health Maintenance Summary     Shingles Vaccine (1 of 2)  Overdue - never done    Medicare Advantage- Medicare Wellness Visit (Yearly - January to December)  Overdue since 1/1/2023    DM/CKD Microalbumin (Yearly)  Overdue since 1/27/2023    COVID-19 Vaccine (5 - Pfizer series)  Overdue since 5/3/2023    Depression Screening (Yearly)  Due soon on 8/15/2023    Influenza Vaccine (1)  Next due on 9/1/2023    DM/CKD GFR (Yearly)  Ordered on 8/15/2022    DTaP/Tdap/Td Vaccine (2 - Td or Tdap)  Next due on 5/15/2030    Pneumococcal Vaccine 65+   Completed    Meningococcal Vaccine   Aged Out    Hepatitis B Vaccine (For Physician/APC Discussion)   Aged Out    HPV Vaccine   Aged Out           Preventive Care for Women and Men    Heart Screenings (Cardiovascular):  · Blood tests are used to check your cholesterol, lipid and triglyceride levels. High levels can increase your risk for heart disease and stroke. High levels can be treated with medications, diet and exercise. Lowering your levels can help keep your heart and blood vessels healthy.  Your provider will order these tests if they are needed.    · An ultrasound is done to see if you have an abdominal aortic aneurysm (AAA).  This is an enlargement of one of the main blood vessels that delivers blood to the body.   In the [place], 9,000 deaths are caused by AAA.  You may not even know you have this problem and as many as 1 in 3 people will have a serious problem if it is not treated.  Early diagnosis allows for more effective treatment and cure.  If you have a family  NEUROLOGY  4/20/2019     Consulted by: Leanna Walton MD        Patient ID:  Cornell Brito  [id number]  89 y.o.  1940    Chief Complaint   Patient presents with    Seizure       HPI    78-year-old gentleman visiting here from [place] who yesterday morning had seizures early in the morning while still in bed. He does not remember this. It was witnessed by family. They brought him to the hospital.  He is now back to his baseline since yesterday. He is receiving Keppra. 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 He also has an incidental meningioma in the right frontal fossa. Since being here in the hospital repeat MRI was done confirming the meningioma but no other acute issue. He does admit to not being compliant with medication. He had been on seizure medicine at one point but discontinued it. He feels tired currently. Review of Systems   Constitutional: Positive for malaise/fatigue. Eyes: Negative for double vision. Neurological: Positive for seizures. Psychiatric/Behavioral: Positive for memory loss. The patient has insomnia. All other systems reviewed and are negative. No past medical history on file. No family history on file.   Social History     Socioeconomic History    Marital status:      Spouse name: Not on file    Number of children: Not on file    Years of education: Not on file    Highest education level: Not on file   Occupational History    Not on file   Social Needs    Financial resource strain: Not on file    Food insecurity:     Worry: Not on file     Inability: Not on file    Transportation needs:     Medical: Not on file     Non-medical: Not on file   Tobacco Use    Smoking status: Not on file   Substance and Sexual Activity    Alcohol use: Not on file    Drug use: Not on file    Sexual activity: Not on file   Lifestyle    Physical activity:     Days per week: Not on file     Minutes per session: Not on file    Stress: Not on file   Relationships    Social connections:     Talks on phone: Not on file     Gets together: Not on file     Attends Congregational service: Not on file     Active member of club or organization: Not on file     Attends meetings of clubs or organizations: Not on file     Relationship status: Not on file    Intimate partner violence:     Fear of current or ex partner: Not on file     Emotionally abused: Not on file     Physically abused: Not on file     Forced sexual activity: Not on file   Other Topics Concern    Not on file   Social History Narrative    Not on file     Current Facility-Administered Medications   Medication Dose Route Frequency    dextrose 5 % - 0.45% NaCl infusion  75 mL/hr IntraVENous CONTINUOUS    levETIRAcetam (KEPPRA) tablet 500 mg  500 mg Oral BID    sodium chloride (NS) flush 5-40 mL  5-40 mL IntraVENous Q8H    sodium chloride (NS) flush 5-40 mL  5-40 mL IntraVENous PRN    ondansetron (ZOFRAN) injection 4 mg  4 mg IntraVENous Q4H PRN    pantoprazole (PROTONIX) 40 mg in sodium chloride 0.9% 10 mL injection  40 mg IntraVENous DAILY    LORazepam (ATIVAN) injection 1 mg  1 mg IntraVENous Q2H PRN    acetaminophen (TYLENOL) tablet 650 mg  650 mg Oral Q6H PRN     Allergies   Allergen Reactions    Avelox [Moxifloxacin] Hives       Visit Vitals  /40 (BP 1 Location: Left arm, BP Patient Position: At rest)   Pulse (!) 56   Temp 97.5 °F (36.4 °C)   Resp 21   Ht 5' 7\" (1.702 m)   Wt 74.6 kg (164 lb 6.4 oz)   SpO2 94%   BMI 25.75 kg/m²     Physical Exam   Constitutional: He appears well-developed and well-nourished. Cardiovascular: Normal rate. Pulmonary/Chest: Effort normal.   Neurological: He has normal strength. Skin: Skin is warm and dry. Psychiatric: His behavior is normal.   Vitals reviewed.     Neurologic Exam     Mental Status   Level history of AAA or are a male age 65-75 who has smoked, you are at higher risk of an AAA.  Your provider can order this test, if needed.    Colorectal Screening:  · There are many tests that are used to check for cancer of your colon and rectum. You and your provider should discuss what test is best for you and when to have it done.  Options include:  · Screening Colonoscopy: exam of the entire colon, seen through a flexible lighted tube.  · Flexible Sigmoidoscopy: exam of the last third (sigmoid portion) of the colon and rectum, seen through a flexible lighted tube.  · Cologuard DNA stool test: a sample of your stool is used to screen for cancer and unseen blood in your stool.  · Fecal Occult Blood Test: a sample of your stool is studied to find any unseen blood    Flu Shot:  · An immunization that helps to prevent influenza (the flu). You should get this every year. The best time to get the shot is in the fall.    Pneumococcal Shot:  • Vaccines help prevent pneumococcal disease, which is any type of illness caused by Streptococcus pneumoniae bacteria. There are two kinds of pneumococcal vaccines available in the [place]:   o Pneumococcal conjugate vaccines (PCV20 or Cuebtbn89®)  o Pneumococcal polysaccharide vaccine (PPSV23 or Huuhdqspa60®)  · For those who have never received any pneumococcal conjugate vaccine, CDC recommends PVC20 for adults 65 years or older and adults 19 through 64 years old with certain medical conditions or risk factors.   · For those who have previously received PCV13, this should be followed by a dose of PPSV23.     Hepatitis B Shot:  · An immunization that helps to protect people from getting Hepatitis B. Hepatitis B is a virus that spreads through contact with infected blood or body fluids. Many people with the virus do not have symptoms.  The virus can lead to serious problems, such as liver disease. Some people are at higher risk than others. Your doctor will tell you if you need  of consciousness: alert    Cranial Nerves   Cranial nerves II through XII intact. Motor Exam   Muscle bulk: normal    Strength   Strength 5/5 throughout. Sensory Exam   Light touch normal.     Gait, Coordination, and Reflexes     Gait  Gait: (Deferred due to seizure precautions)           Lab Results   Component Value Date/Time    WBC 7.7 04/19/2019 04:45 AM    HGB 16.5 04/19/2019 04:45 AM    HCT 49.8 04/19/2019 04:45 AM    PLATELET 719 11/32/7747 04:45 AM    MCV 92.9 04/19/2019 04:45 AM     Lab Results   Component Value Date/Time    Glucose 157 (H) 04/19/2019 04:45 AM    Creatinine 1.32 (H) 04/19/2019 04:45 AM      No results found for: CHOL, CHOLPOCT, HDL, LDL, LDLC, LDLCPOC, LDLCEXT, TRIGL, TGLPOCT, CHHD, CHHDX  Lab Results   Component Value Date/Time    ALT (SGPT) 29 04/19/2019 04:45 AM    AST (SGOT) 36 04/19/2019 04:45 AM    Alk. phosphatase 68 04/19/2019 04:45 AM    Bilirubin, total 0.6 04/19/2019 04:45 AM    Albumin 3.7 04/19/2019 04:45 AM    Protein, total 7.2 04/19/2019 04:45 AM    INR 1.0 04/19/2019 09:40 AM    Prothrombin time 10.1 04/19/2019 09:40 AM    PLATELET 852 84/56/2303 04:45 AM        CT Results (maximum last 3): Results from [place] encounter on 04/19/19   CT CHEST WO CONT    Narrative EXAM:  CT CHEST WITHOUT CONTRAST   INDICATION: Left lung localized rhonchi/wheezing. No COPD, history of hernia? Diaphragmatic. COMPARISON: None. CONTRAST: None. TECHNIQUE: Unenhanced multislice helical CT was performed from the thoracic  inlet to the adrenal glands without intravenous contrast administration. Contiguous 5 mm axial images were reconstructed and lung and soft tissue windows  were generated. Coronal and sagittal reformations were generated. CT dose  reduction was achieved through use of a standardized protocol tailored for this  examination and automatic exposure control for dose modulation.  Adaptive  statistical iterative reconstruction (ASIR) was utilized for this this shot.     Diabetes Screening:  · A test to measure sugar (glucose) in your blood is called a fasting blood sugar. Fasting means you cannot have food or drink for at least 8 hours before the test. This test can detect diabetes long before you may notice symptoms.    Glaucoma Screening:  · Glaucoma screening is performed by your eye doctor. The test measures the fluid pressure inside your eyes to determine if you have glaucoma.     Hepatitis C Screening:  · A blood test to see if you have the hepatitis C virus.  Hepatitis C attacks the liver and is a major cause of chronic liver disease.  Medicare will cover a single screening for all adults born between 1945 & 1965, or high risk patients (people who have injected illegal drugs or people who have had blood transfusions).  High risk patients who continue to inject illegal drugs can be screened for Hepatitis C every year.    Smoking and Tobacco-Use Cessation Counseling:  · Tobacco is the single greatest cause of disease and early death in our country today. Medication and counseling together can increase a person’s chance of quitting for good.   · Medicare covers two quitting attempts per year, with four counseling sessions per attempt (eight sessions in a 12 month period)    Preventive Screening tests for Women    Screening Mammograms and Breast Exams:  · An x-ray of your breasts to check for breast cancer before you or your doctor may be able to feel it.  If breast cancer is found early it can usually be treated with success.    Pelvic Exams and Pap Tests:  · An exam to check for cervical and vaginal cancer. A Pap test is a lab test in which cells are taken from your cervix and sent to the lab to look for signs of cervical cancer. If cancer of the cervix is found early, chances for a cure are good. Testing can generally end at age 65, or if a woman has a hysterectomy for a benign condition. Your provider may recommend more frequent testing if certain abnormal  examination. FINDINGS:  LOWER NECK: The visualized portions of the thyroid and structures of the lower  neck are within normal limits. LUNGS: The lungs are clear of mass, nodule, airspace disease or edema. PLEURA: There is no pleural effusion or pneumothorax. The absence of intravenous contrast reduces the sensitivity for evaluation of  the mediastinum and upper abdominal organs. AORTA: The aorta is atherosclerotic and has a normal contour with no evidence of  aneurysm. There is coronary artery calcification. MEDIASTINUM: No mediastinal or hilar or axillary adenopathy is appreciated. BONES AND SOFT TISSUES: There are mild degenerative changes of the thoracic  spine. UPPER ABDOMEN: There is a small low-attenuation lesion in the right kidney,  probably a cyst.      Impression IMPRESSION:   1. No acute abnormality. 2. Atherosclerosis with coronary artery calcification. 3. Thoracic spondylosis. CT HEAD WO CONT    Narrative EXAM:  CT head without contrast    INDICATION: Seizure. COMPARISON: None. TECHNIQUE: Axial noncontrast head CT from foramen magnum to vertex. Coronal and  sagittal reformatted images were obtained. CT dose reduction was achieved  through use of a standardized protocol tailored for this examination and  automatic exposure control for dose modulation. FINDINGS:  There is diffuse age-related parenchymal volume loss. The ventricles  and sulci are age-appropriate without hydrocephalus. There is no mass effect or  midline shift. There is no intracranial hemorrhage or extra-axial fluid  collection. A right frontal lobe mass measures 1.7 x 1.9 cm (series 2, image  15). This may be parenchymal or dural-based. The basal cisterns are patent. A prior left frontoparietal craniotomy is noted. There is diffuse paranasal  sinus mucosal thickening. The mastoid air cells are clear. Impression IMPRESSION:   1. A right frontal lobe mass measures 1.7 x 1.9 cm.  This may be parenchymal results are found.    Bone Mass Measurements:  · A painless x-ray of your bone density to see if you are at risk for a broken bone. Bone density refers to the thickness of bones or how tightly the bone tissue is packed.    Preventive Screening tests for Men    Prostate Screening:  · Should you have a prostate cancer test (PSA)?  It is up to you to decide if you want a prostate cancer test. Talk to your clinician to find out if the test is right for you.  Things for you to consider and talk about should include:  · Benefits and harms of the test  · Your family history  · How your race/ethnicity may influence the test  · If the test may impact other medical conditions you have  · Your values on screenings and treatments    *Medicare pays for many preventive services to keep you healthy. For some of these services, you might have to pay a deductible, coinsurance, and / or copayment.  The amounts vary depending on the type of services you need and the kind of Medicare health plan you have.    For further details on screenings offered by Medicare please visit: https://www.medicare.gov/coverage/preventive-screening-services    or  dural based. Comparison with prior imaging is suggested. No acute intracranial  hemorrhage. 2. Diffuse paranasal sinus inflammation. MRI Results (maximum last 3): Results from [facility] encounter on 04/19/19   MRI BRAIN W WO CONT    Narrative *PRELIMINARY REPORT*    Well-circumscribed round 1.7 cm hypervascular extra-axial right subfrontal mass  without edema. This is consistent with a meningioma. Preliminary report was provided by Dr. Steven Meyers, the on-call radiologist, at 2131  hours. Final report to follow. *END PRELIMINARY REPORT*                    VAS/US/Carotid Doppler Results (maximum last 3): No results found for this or any previous visit. PET Results (maximum last 3): No results found for this or any previous visit. Assessment and Plan        78-year-old gentleman who had a seizure more than 24 hours ago. Now back to his baseline. 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 He needs to stay on anticonvulsants from here on out and I discussed this with him personally. I will change Keppra to p.o. Please give an additional dose today midday and then he will resume tonight twice daily dosing. I do not have additional testing for him. No need for EEG. Request that he take a copy of his imaging on CD with him so he can take it to his neurologist in [place]. No driving. Questions answered in detail. We will sign off. Okay to discharge from neuro perspective.         2 [facility], DO  NEUROLOGIST  Diplomate BRAYAN  4/20/2019